# Patient Record
Sex: MALE | Race: WHITE | NOT HISPANIC OR LATINO | URBAN - METROPOLITAN AREA
[De-identification: names, ages, dates, MRNs, and addresses within clinical notes are randomized per-mention and may not be internally consistent; named-entity substitution may affect disease eponyms.]

---

## 2019-05-18 ENCOUNTER — OFFICE VISIT - HEALTHEAST (OUTPATIENT)
Dept: FAMILY MEDICINE | Facility: CLINIC | Age: 69
End: 2019-05-18

## 2019-05-18 DIAGNOSIS — T14.8XXA WOUND INFECTION: ICD-10-CM

## 2019-05-18 DIAGNOSIS — L08.9 WOUND INFECTION: ICD-10-CM

## 2021-05-28 NOTE — PROGRESS NOTES
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  The wound has been present for about 8 months.  He completed antibiotics last month.  The wound appeared clean, dry without any erythema, drainage, swelling.  I advised wound care and dressing change.  I do not recommend antibiotic at this time.  However, I did print him a prescription for antibiotic provided to him with instructions of when to take it should he have symptoms in the next few days that are consistent with infection.  I recommend that he seek out a wound care provider when he goes back to [place].  Call a follow-up if he has any further questions or concerns.  He verbalized understanding and agreed with the plan  -     amoxicillin-clavulanate (AUGMENTIN) 875-125 mg per tablet; Take 1 tablet by mouth 2 (two) times a day for 10 days.    SUBJECTIVE:    Emre Mehta is a 68 y.o. male who came in today for a wound of the medial side of his ankle.  Patient stated that 9 months ago he had a blister below his medial malleolus.  The blister popped and created a wound.  He had been dressing the wound up until April when it appeared to be infected.  He was then seen at a local clinic in [place] where he lives.  He was given antibiotic (name unknown) which did not help with the wound.  He was then seen again and given levofloxacin to take for 10 days, she stated helped with the wound.  At this time he had been dressing the wound twice a day with over-the-counter lidocaine topical in addition to his silver sulfadiazine.  He was then in his normal state of health until couple days ago when he thought he noticed some redness along the borders of his wound.  No discharge.  No tenderness.  No fever or chills.  Concern for an infection, he is here for an  evaluation.    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  He is from [place].  He does not have any medical history.  Denies diabetes.  Denies hypertension.  He takes no prescription medications.    Review of Systems (except those mentioned above)  Constitutional: Negative.   HENT: Negative.   Eyes: Negative.   Respiratory: Negative.   Cardiovascular: Negative.   Gastrointestinal: Negative.   Endocrine: Negative.   Genitourinary: Negative.   Musculoskeletal: Negative.   Skin: Negative.   Allergic/Immunologic: Negative.   Neurological: Negative.   Hematological: Negative.   Psychiatric/Behavioral: Negative.     There are no active problems to display for this patient.    No Known Allergies  Current Outpatient Medications   Medication Sig Dispense Refill     amoxicillin-clavulanate (AUGMENTIN) 875-125 mg per tablet Take 1 tablet by mouth 2 (two) times a day for 10 days. 20 tablet 0     No current facility-administered medications for this visit.      No past medical history on file.  No past surgical history on file.  Social History     Socioeconomic History     Marital status: Single     Spouse name: Not on file     Number of children: Not on file     Years of education: Not on file     Highest education level: Not on file   Occupational History     Not on file   Social Needs     Financial resource strain: Not on file     Food insecurity:     Worry: Not on file     Inability: Not on file     Transportation needs:     Medical: Not on file     Non-medical: Not on file   Tobacco Use     Smoking status: Former Smoker     Smokeless tobacco: Never Used   Substance and Sexual Activity     Alcohol use: Not on file     Drug use: Not on file     Sexual activity: Not on file   Lifestyle     Physical activity:     Days per week: Not on file     Minutes per session: Not on file     Stress: Not on file   Relationships     Social connections:     Talks on phone: Not on file     Gets together: Not on file     Attends  L[employer] service: Not on file     Active member of club or organization: Not on file     Attends meetings of clubs or organizations: Not on file     Relationship status: Not on file     Intimate partner violence:     Fear of current or ex partner: Not on file     Emotionally abused: Not on file     Physically abused: Not on file     Forced sexual activity: Not on file   Other Topics Concern     Not on file   Social History Narrative     Not on file     No family history on file.      OBJECTIVE:    Vitals:    05/18/19 1132   BP: 128/72   Patient Site: Right Arm   Patient Position: Sitting   Cuff Size: Adult Regular   Pulse: 78   Resp: 14   Temp: 97.6  F (36.4  C)   TempSrc: Oral   SpO2: 98%   Weight: 197 lb 6.4 oz (89.5 kg)     There is no height or weight on file to calculate BMI.    Physical Exam:  Constitutional: Patient is oriented to person, place, and time. Patient appears well-developed and well-nourished. No distress.   Head: Normocephalic and atraumatic.   Right Ear: External ear normal.   Left Ear: External ear normal.   Nose: Nose normal.   Eyes: Conjunctivae and EOM are normal. Right eye exhibits no discharge. Left eye exhibits no discharge. No scleral icterus.   Neurological: Patient is alert and oriented to person, place, and time. Patient has normal reflexes. No cranial nerve deficit. Coordination normal.   Skin: A 2 inch superficial wound along the medial aspect of his right ankle.  The borders are not erythematous.  The wound appeared clean and dry.

## 2021-06-03 VITALS — WEIGHT: 197.4 LBS
